# Patient Record
Sex: FEMALE | Race: BLACK OR AFRICAN AMERICAN
[De-identification: names, ages, dates, MRNs, and addresses within clinical notes are randomized per-mention and may not be internally consistent; named-entity substitution may affect disease eponyms.]

---

## 2017-03-30 NOTE — RAD
LEFT SHOULDER THREE VIEWS:

 

History: Fall three months ago, chronic pain. 

 

Comparison: None. 

 

FINDINGS: 

No acute fracture or malalignment. Mild degenerative disease of the acromioclavicular joint. There i
s calcific tendinosis of the rotator cuff at the level of the supraspinatus tendon. 

 

IMPRESSION: 

1. No acute fracture or malalignment. 

2. Mild calcific tendinosis of the rotator cuff. 

3. No acute fracture or malalignment. 

 

POS: [facility]

## 2018-09-27 NOTE — MRI
MRI OF THE LEFT SHOULDER:

 

Date: 9-27-18 

 

Provided Clinical History: 

Left shoulder pain. 

 

FINDINGS: 

Comparison is made with a study dated 4-22-17. 

 

Evaluation is limited by patient motion. 

 

The components of the rotator cuff demonstrate no evidence for full thickness tear. There is increase
d signal intensity on fluid sensitive sequences involving the under surface of the posterior distal s
upraspinatus tendon that could reflect low grade partial thickness undersurface tearing. Signal alter
ation involving the cranial fibers of the subscapularis near the lesser tuberosity insertion could al
so reflect partial thickness undersurface tearing. There is thickening and increased signal intensity
 on fluid sensitive sequences involving the intraarticular segment of the long head biceps tendon. Sl
ightly irregular appearance to the long head biceps tendon within the bicipital groove is also seen. 


 

There is a small glenohumeral joint effusion. The glenoid labrum and glenohumeral articular cartilage
 appear normal. There is no significant subacromial/subdeltoid bursal fluid. Acromioclavicular joint 
osteoarthrosis is noted with mild mass effect upon the subjacent supraspinatus. Rotator cuff muscular
 volume appears preserved. 

 

There is a small focus of oval diminished signal intensity involving the region of the infraspinatus 
tendon near the insertion likely reflecting sequellae of prior calcific tendinitis. No surrounding ed
ematous type change is seen. 

 

IMPRESSION: 

1. At least tendinosis and probably partial thickness interstitial tearing involving the intraarticul
ar and proximal bicipital segments of the long head biceps tendon. 

2. Suspected under surface tearing involving the subscapularis and supraspinatus tendons as described
 above. 

3. Small glenohumeral joint effusion. 

 

POS: [facility]

## 2018-10-19 ENCOUNTER — HOSPITAL ENCOUNTER (OUTPATIENT)
Dept: HOSPITAL 92 - LABBT | Age: 58
Discharge: HOME | End: 2018-10-19
Attending: ORTHOPAEDIC SURGERY
Payer: MEDICARE

## 2018-10-19 DIAGNOSIS — S46.212A: ICD-10-CM

## 2018-10-19 DIAGNOSIS — Z01.812: Primary | ICD-10-CM

## 2018-10-19 DIAGNOSIS — M75.102: ICD-10-CM

## 2018-10-19 LAB
ANION GAP SERPL CALC-SCNC: 12 MMOL/L (ref 10–20)
BASOPHILS # BLD AUTO: 0 THOU/UL (ref 0–0.2)
BASOPHILS NFR BLD AUTO: 0.7 % (ref 0–1)
BUN SERPL-MCNC: 15 MG/DL (ref 9.8–20.1)
CALCIUM SERPL-MCNC: 9.4 MG/DL (ref 7.8–10.44)
CHLORIDE SERPL-SCNC: 102 MMOL/L (ref 98–107)
CO2 SERPL-SCNC: 29 MMOL/L (ref 22–29)
CREAT CL PREDICTED SERPL C-G-VRATE: 0 ML/MIN (ref 70–130)
EOSINOPHIL # BLD AUTO: 0.2 THOU/UL (ref 0–0.7)
EOSINOPHIL NFR BLD AUTO: 2.1 % (ref 0–10)
GLUCOSE SERPL-MCNC: 107 MG/DL (ref 70–105)
HGB BLD-MCNC: 13.9 G/DL (ref 12–16)
LYMPHOCYTES # BLD: 2.5 THOU/UL (ref 1.2–3.4)
LYMPHOCYTES NFR BLD AUTO: 34.5 % (ref 21–51)
MCH RBC QN AUTO: 29.8 PG (ref 27–31)
MCV RBC AUTO: 90.9 FL (ref 78–98)
MONOCYTES # BLD AUTO: 0.5 THOU/UL (ref 0.11–0.59)
MONOCYTES NFR BLD AUTO: 6.2 % (ref 0–10)
NEUTROPHILS # BLD AUTO: 4.2 THOU/UL (ref 1.4–6.5)
NEUTROPHILS NFR BLD AUTO: 56.6 % (ref 42–75)
PLATELET # BLD AUTO: 288 THOU/UL (ref 130–400)
POTASSIUM SERPL-SCNC: 4 MMOL/L (ref 3.5–5.1)
RBC # BLD AUTO: 4.65 MILL/UL (ref 4.2–5.4)
SODIUM SERPL-SCNC: 139 MMOL/L (ref 136–145)
WBC # BLD AUTO: 7.4 THOU/UL (ref 4.8–10.8)

## 2018-10-19 PROCEDURE — 80048 BASIC METABOLIC PNL TOTAL CA: CPT

## 2018-10-19 PROCEDURE — 85025 COMPLETE CBC W/AUTO DIFF WBC: CPT

## 2018-10-26 ENCOUNTER — HOSPITAL ENCOUNTER (EMERGENCY)
Dept: HOSPITAL 92 - ERS | Age: 58
Discharge: HOME | End: 2018-10-26
Payer: MEDICARE

## 2018-10-26 ENCOUNTER — HOSPITAL ENCOUNTER (OUTPATIENT)
Dept: HOSPITAL 92 - SDC | Age: 58
Discharge: HOME | End: 2018-10-26
Attending: ORTHOPAEDIC SURGERY
Payer: MEDICARE

## 2018-10-26 VITALS — BODY MASS INDEX: 48.5 KG/M2

## 2018-10-26 DIAGNOSIS — E78.5: ICD-10-CM

## 2018-10-26 DIAGNOSIS — Z91.040: ICD-10-CM

## 2018-10-26 DIAGNOSIS — Z79.899: ICD-10-CM

## 2018-10-26 DIAGNOSIS — F41.9: ICD-10-CM

## 2018-10-26 DIAGNOSIS — Z79.82: ICD-10-CM

## 2018-10-26 DIAGNOSIS — F32.9: ICD-10-CM

## 2018-10-26 DIAGNOSIS — K21.9: ICD-10-CM

## 2018-10-26 DIAGNOSIS — Z88.8: ICD-10-CM

## 2018-10-26 DIAGNOSIS — Z79.1: ICD-10-CM

## 2018-10-26 DIAGNOSIS — E66.9: ICD-10-CM

## 2018-10-26 DIAGNOSIS — Z88.0: ICD-10-CM

## 2018-10-26 DIAGNOSIS — I10: ICD-10-CM

## 2018-10-26 DIAGNOSIS — M75.122: Primary | ICD-10-CM

## 2018-10-26 DIAGNOSIS — M75.22: ICD-10-CM

## 2018-10-26 DIAGNOSIS — Z88.2: ICD-10-CM

## 2018-10-26 DIAGNOSIS — S46.212A: ICD-10-CM

## 2018-10-26 DIAGNOSIS — E11.9: ICD-10-CM

## 2018-10-26 DIAGNOSIS — Z79.4: ICD-10-CM

## 2018-10-26 DIAGNOSIS — T85.898A: Primary | ICD-10-CM

## 2018-10-26 PROCEDURE — 99284 EMERGENCY DEPT VISIT MOD MDM: CPT

## 2018-10-26 PROCEDURE — 97139 UNLISTED THERAPEUTIC PX: CPT

## 2018-10-26 PROCEDURE — 29827 SHO ARTHRS SRG RT8TR CUF RPR: CPT

## 2018-10-26 PROCEDURE — 82962 GLUCOSE BLOOD TEST: CPT

## 2018-10-26 PROCEDURE — 96376 TX/PRO/DX INJ SAME DRUG ADON: CPT

## 2018-10-26 PROCEDURE — 0LM24ZZ REATTACHMENT OF LEFT SHOULDER TENDON, PERCUTANEOUS ENDOSCOPIC APPROACH: ICD-10-PCS | Performed by: ORTHOPAEDIC SURGERY

## 2018-10-26 PROCEDURE — 36416 COLLJ CAPILLARY BLOOD SPEC: CPT

## 2018-10-26 PROCEDURE — 96374 THER/PROPH/DIAG INJ IV PUSH: CPT

## 2018-10-26 PROCEDURE — 0RNK4ZZ RELEASE LEFT SHOULDER JOINT, PERCUTANEOUS ENDOSCOPIC APPROACH: ICD-10-PCS | Performed by: ORTHOPAEDIC SURGERY

## 2018-10-26 PROCEDURE — 94660 CPAP INITIATION&MGMT: CPT

## 2018-10-26 PROCEDURE — A4306 DRUG DELIVERY SYSTEM <=50 ML: HCPCS

## 2018-10-26 PROCEDURE — 0RHK44Z INSERTION OF INTERNAL FIXATION DEVICE INTO LEFT SHOULDER JOINT, PERCUTANEOUS ENDOSCOPIC APPROACH: ICD-10-PCS | Performed by: ORTHOPAEDIC SURGERY

## 2018-10-26 PROCEDURE — 29826 SHO ARTHRS SRG DECOMPRESSION: CPT

## 2019-01-15 ENCOUNTER — HOSPITAL ENCOUNTER (OUTPATIENT)
Dept: HOSPITAL 92 - BICMAMMO | Age: 59
Discharge: HOME | End: 2019-01-15
Payer: MEDICARE

## 2019-01-15 DIAGNOSIS — Z12.31: Primary | ICD-10-CM

## 2019-01-15 PROCEDURE — 77067 SCR MAMMO BI INCL CAD: CPT

## 2019-01-15 PROCEDURE — 77063 BREAST TOMOSYNTHESIS BI: CPT

## 2019-02-26 ENCOUNTER — HOSPITAL ENCOUNTER (EMERGENCY)
Dept: HOSPITAL 92 - ERS | Age: 59
Discharge: HOME | End: 2019-02-26
Payer: MEDICARE

## 2019-02-26 DIAGNOSIS — M19.90: ICD-10-CM

## 2019-02-26 DIAGNOSIS — F17.210: ICD-10-CM

## 2019-02-26 DIAGNOSIS — E11.9: ICD-10-CM

## 2019-02-26 DIAGNOSIS — K21.9: ICD-10-CM

## 2019-02-26 DIAGNOSIS — M10.9: ICD-10-CM

## 2019-02-26 DIAGNOSIS — L03.116: Primary | ICD-10-CM

## 2019-02-26 DIAGNOSIS — I10: ICD-10-CM

## 2019-02-26 DIAGNOSIS — F41.9: ICD-10-CM

## 2019-02-26 DIAGNOSIS — E78.5: ICD-10-CM

## 2019-02-26 DIAGNOSIS — F32.9: ICD-10-CM

## 2019-02-26 PROCEDURE — 85379 FIBRIN DEGRADATION QUANT: CPT

## 2019-02-26 PROCEDURE — 96372 THER/PROPH/DIAG INJ SC/IM: CPT

## 2019-02-26 NOTE — ULT
LEFT LOWER EXTREMITY VENOUS DUPLEX STUDY:

2/26/19

 

Deep veins of the left lower extremity evaluated with color doppler, spectral analysis and compressio
n.

 

INDICATIONS:

Left lower extremity pain and edema. 

 

Deep veins of the left lower extremity show normal blood flow and compression. No evidence of DVT. Pr
ominent lymph nodes are seen in the left inguinal region. 

 

IMPRESSION:  

1.      No evidence of left lower extremity DVT. 

2.      Prominent left inguinal lymph nodes noted. 

 

POS: [facility]

## 2019-02-26 NOTE — RAD
LEFT ANKLE 3 VIEWS:

 

Date:  02/26/19 

 

HISTORY:  

Ankle injury. 

 

FINDINGS:

There are vascular calcifications noted. The lateral view is very obliqued and therefore difficult to
 assess. There is spurring along the medial malleolus. There is ossification which is near the talona
vicular joint. It may be related to an os naviculare, difficult to assess on these views. Calcaneal s
purs are present. I do not see any signs of fracture.

 

IMPRESSION: 

No evidence of fracture. Assessment of the foot is limited due to the orientation of the images. 

 

 

POS: [facility]

## 2019-05-13 ENCOUNTER — HOSPITAL ENCOUNTER (OUTPATIENT)
Dept: HOSPITAL 92 - WCC | Age: 59
Discharge: HOME | End: 2019-05-13
Attending: FAMILY MEDICINE
Payer: MEDICARE

## 2019-05-13 DIAGNOSIS — E11.22: ICD-10-CM

## 2019-05-13 DIAGNOSIS — E11.65: ICD-10-CM

## 2019-05-13 DIAGNOSIS — I50.30: ICD-10-CM

## 2019-05-13 DIAGNOSIS — G89.29: ICD-10-CM

## 2019-05-13 DIAGNOSIS — I87.312: Primary | ICD-10-CM

## 2019-05-13 DIAGNOSIS — I11.0: ICD-10-CM

## 2019-05-13 DIAGNOSIS — M54.9: ICD-10-CM

## 2019-05-13 DIAGNOSIS — M48.00: ICD-10-CM

## 2019-05-13 DIAGNOSIS — K21.9: ICD-10-CM

## 2019-05-13 DIAGNOSIS — L97.922: ICD-10-CM

## 2019-05-13 PROCEDURE — A4218 STERILE SALINE OR WATER: HCPCS

## 2019-05-14 NOTE — HP
HISTORY OF PRESENT ILLNESS:  Ms. Nat Ibrahim is a very pleasant 58-year-old, who

presents to [facility] for evaluation of an ulceration of the left lower leg.

The patient states that she scraped her leg on a curb in February of this year.  She

states that initially she utilized mupirocin for treatment of the wound, but because

of burning, began utilizing Vaseline.  She states that plain films of the left ankle

were obtained by Dr. Nunez, and the patient was eventually seen by Dr. Morton

because the wound remained unhealed.  The patient was referred to the [facility]

by Dr. Morton on 05/02/2019.  The patient states that she has been cleansing the

wound with sterile water and gauze.  She has been dressing the wound with Hydrofera

Blue followed by a Band-Aid.  The patient has also been taking Keflex as per Dr. Morton. 



PAST MEDICAL HISTORY:  

1. Gastroesophageal reflux disease.

2. Diabetes mellitus.

3. Hypertension.

4. Chronic back pain.

5. Spinal stenosis.

6. Congestive heart failure, diastolic.



PAST SURGICAL HISTORY:  

1. Exploratory laparotomy for ruptured right ovarian cyst.

2. Incision and drainage of infected abdominal wound.

3. Appendectomy.

4. Cholecystectomy.

5. Herniorrhaphy.

6. Tonsillectomy.

7. Arthroscopic surgery of left shoulder.

8. Back surgery x2.



MEDICATIONS:  

1. Keflex.

2. Levemir.

3. Glipizide.

4. Furosemide.

5. Pravachol.

6. Lisinopril.

7. Melatonin.

8. Potassium.

9. Cyclobenzaprine.

10. Effexor.

11. Vitamin D.

12. Amlodipine.

13. Zetia.

14. Meloxicam.

15. Inhaler.



ALLERGIES:  LATEX, SULFA, ERYTHROMYCIN.



SOCIAL HISTORY:  Significant for tobacco use of up to 1 pack of cigarettes per day

for 40 years.  The patient denies any history of EtOH use. 



FAMILY HISTORY:  The patient states she is adopted.



PHYSICAL EXAMINATION:

VITAL SIGNS:  Temperature 97.5, pulse 92, blood pressure 188/78.  Accu-Chek 81. 

GENERAL:  A 58-year-old female, sitting on stretcher in examination room, in no

acute distress. 

HEENT:  Normocephalic and atraumatic. 

NECK:  No nuchal rigidity. 

CHEST:  Clear to auscultation. 

CV:  Regular rate and rhythm. 

ABDOMEN:  Soft. 

EXTREMITIES:  An ulceration of the left lower leg in the region of the lateral

malleolus is present, which measures approximately 0.8 x 0.4 cm.  Granulation tissue

is present within the wound margins.  Necrotic and nonviable tissue present within

the wound margins was debrided with an excisional full-thickness debridement with

the use of a curette.  No purulent drainage is associated with the wound.  No

erythema of the skin surrounding the wound is present.  No maceration of the skin of

the periwound is noted.  A dorsalis pedis pulse is palpable on the left.  No

significant edema of the left foot or lower leg is present on exam today. 

NEURO:  Grossly nonfocal.



ASSESSMENT AND PLAN:  

1. Chronic venous hypertension with ulceration.  Dressing changes of Hydrofera Blue

will be initiated today.  These dressing changes are to be performed on a daily

basis after cleansing and irrigation.  The patient states that she will be

performing her own dressing changes.  I will see Ms. Ibrahmi again in 2 weeks if her

ulceration is still present.  At this time, the patient has been instructed to

continue Keflex as previously prescribed.  The patient understands and is in

agreement with the preceding treatment plan.  The patient has previously undergone

venous ablation on the left by Dr. Jhaveri.  She also has compression garments.

The patient states that she will begin utilizing her compression garments

immediately should she begin to experience edema of her right or left lower

extremity. 

2. Gastroesophageal reflux disease.

3. Diabetes mellitus.  The patient's Accu-Chek in clinic today is 81.  The patient

has been told that for optimal wound healing, her blood glucoses should remain below

150. 

4. Hypertension.

5. Chronic back pain.

6. Spinal stenosis.

7. Congestive heart failure, diastolic.







Job ID:  [id number]

## 2020-08-31 NOTE — RAD
EXAM:

XR Chest 1 View Portable



PROVIDED CLINICAL HISTORY:

Dyspnea



COMPARISON:

12/12/2016



FINDINGS:

Cardiac silhouette remains enlarged. Prominence of the pulmonary vasculature and pulmonary interstiti
um persists. Evaluation is limited by patient body habitus. No lobar consolidation, large effusion

or pneumothorax evident.



IMPRESSION:

Cardiomegaly and findings suggesting congestive failure. Alveolar edema is not excluded. Follow-up is
 recommended.



Reported By: Jared Turner 

Electronically Signed:  8/31/2020 12:18 PM

## 2020-09-25 ENCOUNTER — HOSPITAL ENCOUNTER (OUTPATIENT)
Dept: HOSPITAL 92 - BICMAMMO | Age: 60
Discharge: HOME | End: 2020-09-25
Payer: MEDICARE

## 2020-09-25 DIAGNOSIS — Z12.31: Primary | ICD-10-CM

## 2020-09-25 PROCEDURE — 77067 SCR MAMMO BI INCL CAD: CPT

## 2020-09-25 PROCEDURE — 77063 BREAST TOMOSYNTHESIS BI: CPT

## 2020-09-25 NOTE — MMO
Bilateral MAMMO Bilat Screen [facility].

 

CLINICAL HISTORY:

Patient is 60 years old and is seen for screening. The patient has no family

history of breast cancer.  The patient has no personal history of cancer.

 

VIEWS:

The views performed were:  bilateral craniocaudal; bilateral craniocaudal with

tomosynthesis; bilateral mediolateral oblique; and bilateral mediolateral

oblique with tomosynthesis.

 

FILMS COMPARED:

The present examination has been compared to prior imaging studies performed at

[facility] on 09/22/2016 and 01/15/2019.

 

This study has been interpreted with the assistance of computer-aided detection.

 

MAMMOGRAM FINDINGS:

The breasts are heterogeneously dense, which could obscure a lesion on

mammography.

 

Benign calcifications are noted bilaterally.

 

There are no suspicious masses, suspicious calcifications, or new areas of

architectural distortion.

 

IMPRESSION:

THERE IS NO MAMMOGRAPHIC EVIDENCE OF MALIGNANCY.

 

A ROUTINE FOLLOW-UP MAMMOGRAM IN 1 YEAR IS RECOMMENDED.

 

THE RESULTS OF THIS EXAM WERE SENT TO THE PATIENT.

 

ACR BI-RADS Category 2 - Benign finding

 

MAMMOGRAPHY NOTE:

 1. A negative mammogram report should not delay a biopsy if a dominant of

 clinically suspicious mass is present.

 2. Approximately 10% to 15% of breast cancers are not detected by

 mammography.

 3. Adenosis and dense breasts may obscure an underlying neoplasm.

 

 

Reported by: LUKASZ GOODWIN MD

Electonically Signed: [id number]

## 2021-02-15 ENCOUNTER — HOSPITAL ENCOUNTER (EMERGENCY)
Dept: HOSPITAL 9 - MADERS | Age: 61
Discharge: HOME | End: 2021-02-15
Payer: MEDICARE

## 2021-02-15 DIAGNOSIS — E11.65: ICD-10-CM

## 2021-02-15 DIAGNOSIS — M10.9: ICD-10-CM

## 2021-02-15 DIAGNOSIS — I11.0: Primary | ICD-10-CM

## 2021-02-15 DIAGNOSIS — E87.6: ICD-10-CM

## 2021-02-15 DIAGNOSIS — J44.9: ICD-10-CM

## 2021-02-15 DIAGNOSIS — I50.9: ICD-10-CM

## 2021-02-15 DIAGNOSIS — Z79.899: ICD-10-CM

## 2021-02-15 PROCEDURE — 85025 COMPLETE CBC W/AUTO DIFF WBC: CPT

## 2021-02-15 PROCEDURE — 84295 ASSAY OF SERUM SODIUM: CPT

## 2021-02-15 PROCEDURE — 83880 ASSAY OF NATRIURETIC PEPTIDE: CPT

## 2021-02-15 PROCEDURE — 82803 BLOOD GASES ANY COMBINATION: CPT

## 2021-02-15 PROCEDURE — 84484 ASSAY OF TROPONIN QUANT: CPT

## 2021-02-15 PROCEDURE — 82330 ASSAY OF CALCIUM: CPT

## 2021-02-15 PROCEDURE — 82435 ASSAY OF BLOOD CHLORIDE: CPT

## 2021-02-15 PROCEDURE — 85014 HEMATOCRIT: CPT

## 2021-02-15 PROCEDURE — 84132 ASSAY OF SERUM POTASSIUM: CPT

## 2021-02-15 PROCEDURE — 80053 COMPREHEN METABOLIC PANEL: CPT

## 2021-02-15 PROCEDURE — 71046 X-RAY EXAM CHEST 2 VIEWS: CPT

## 2021-02-15 PROCEDURE — 36415 COLL VENOUS BLD VENIPUNCTURE: CPT

## 2021-02-15 NOTE — RAD
CHEST TWO VIEWS:

 

02/15/21

 

10:39 a.m.

 

HISTORY:

Chest pain. Shortness of breath.

 

COMPARISON:

08/31/2020

 

FINDINGS:

There is enlargement of the cardiac silhouette. Dorsal column stimulators overly the lower thoracic r
egion. There is no pneumothorax. There is nonspecific interstitial opacity in the perihilar regions i
n both lung bases with small bilateral pleural effusions.

 

IMPRESSION:

Pulmonary vascular congestion with perihilar and bibasilar interstitial prominence and small bilatera
l pleural effusions.

 

Findings suggest interstitial edema. Recommend short-term follow-up imaging following treatment to do
cument resolution.

 

Nonspecific infectious pneumonitis cannot be excluded.

 

POS: [facility]

## 2021-02-16 LAB
ALBUMIN SERPL BCG-MCNC: 3.7 G/DL (ref 3.4–4.8)
ALP SERPL-CCNC: 81 U/L (ref 40–110)
ALT SERPL W P-5'-P-CCNC: 14 U/L (ref 8–55)
ANION GAP SERPL CALC-SCNC: 14 MMOL/L (ref 10–20)
AST SERPL-CCNC: 9 U/L (ref 5–34)
BASOPHILS # BLD AUTO: 0.2 THOU/UL (ref 0–0.2)
BASOPHILS NFR BLD AUTO: 1.7 % (ref 0–1)
BILIRUB SERPL-MCNC: 0.6 MG/DL (ref 0.2–1.2)
BUN SERPL-MCNC: 10 MG/DL (ref 9.8–20.1)
CA-I BLDV-SCNC: 1.06 MMOL/L (ref 1.15–1.33)
CALCIUM SERPL-MCNC: 8.5 MG/DL (ref 7.8–10.44)
CHLORIDE BLDV-SCNC: 101 MMOL/L (ref 98–107)
CHLORIDE SERPL-SCNC: 100 MMOL/L (ref 98–107)
CO2 BLDV CALC-SCNC: 31.5 MMOL/L (ref 22–28)
CO2 SERPL-SCNC: 29 MMOL/L (ref 23–31)
CREAT CL PREDICTED SERPL C-G-VRATE: 0 ML/MIN (ref 70–130)
EOSINOPHIL # BLD AUTO: 0.2 THOU/UL (ref 0–0.7)
EOSINOPHIL NFR BLD AUTO: 2.3 % (ref 0–10)
GLOBULIN SER CALC-MCNC: 3.3 G/DL (ref 2.4–3.5)
GLUCOSE SERPL-MCNC: 314 MG/DL (ref 80–115)
HCO3 BLDV-SCNC: 29.7 MMOL/L (ref 22–28)
HCT VFR BLD CALC: 41 % (ref 36–47)
HGB BLD CALC-MCNC: 14.1 G/DL (ref 12–16)
HGB BLD-MCNC: 13.3 G/DL (ref 12–16)
LYMPHOCYTES # BLD AUTO: 1.2 THOU/UL (ref 1.2–3.4)
LYMPHOCYTES NFR BLD AUTO: 12.2 % (ref 21–51)
MCH RBC QN AUTO: 28.7 PG (ref 27–31)
MCV RBC AUTO: 88.7 FL (ref 78–98)
MONOCYTES # BLD AUTO: 0.9 THOU/UL (ref 0.11–0.59)
MONOCYTES NFR BLD AUTO: 8.8 % (ref 0–10)
NEUTROPHILS # BLD AUTO: 7.4 THOU/UL (ref 1.4–6.5)
NEUTROPHILS NFR BLD AUTO: 75 % (ref 42–75)
PCO2 BLDV: 57.8 MMHG (ref 40–50)
PLATELET # BLD AUTO: 270 THOU/UL (ref 130–400)
POTASSIUM BLDV-SCNC: 3.2 MMOL/L (ref 3.5–5.1)
POTASSIUM SERPL-SCNC: 3.2 MMOL/L (ref 3.5–5.1)
RBC # BLD AUTO: 4.65 MILL/UL (ref 4.2–5.4)
SAO2 % BLDV FROM PO2: 94.2 % (ref 60–85)
SODIUM BLDV-SCNC: 141 MMOL/L (ref 138–145)
SODIUM SERPL-SCNC: 140 MMOL/L (ref 136–145)
TROPONIN I SERPL DL<=0.01 NG/ML-MCNC: 0.01 NG/ML (ref ?–0.03)
WBC # BLD AUTO: 9.9 THOU/UL (ref 4.8–10.8)

## 2021-05-27 ENCOUNTER — HOSPITAL ENCOUNTER (EMERGENCY)
Dept: HOSPITAL 9 - MADERS | Age: 61
Discharge: HOME | End: 2021-05-27
Payer: MEDICARE

## 2021-05-27 DIAGNOSIS — F17.210: ICD-10-CM

## 2021-05-27 DIAGNOSIS — Z79.899: ICD-10-CM

## 2021-05-27 DIAGNOSIS — E78.5: ICD-10-CM

## 2021-05-27 DIAGNOSIS — M71.22: Primary | ICD-10-CM

## 2021-05-27 DIAGNOSIS — E11.9: ICD-10-CM

## 2021-05-27 DIAGNOSIS — I10: ICD-10-CM

## 2021-05-27 DIAGNOSIS — J44.9: ICD-10-CM

## 2021-05-27 PROCEDURE — 96374 THER/PROPH/DIAG INJ IV PUSH: CPT

## 2021-06-24 ENCOUNTER — HOSPITAL ENCOUNTER (INPATIENT)
Dept: HOSPITAL 9 - MADMS | Age: 61
LOS: 8 days | Discharge: HOME HEALTH | DRG: 948 | End: 2021-07-02
Attending: FAMILY MEDICINE | Admitting: FAMILY MEDICINE
Payer: MEDICARE

## 2021-06-24 VITALS — BODY MASS INDEX: 46.7 KG/M2

## 2021-06-24 DIAGNOSIS — F41.9: ICD-10-CM

## 2021-06-24 DIAGNOSIS — E11.649: ICD-10-CM
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
## 2021-06-24 DIAGNOSIS — Z91.81: ICD-10-CM

## 2021-06-24 DIAGNOSIS — F17.210: ICD-10-CM

## 2021-06-24 DIAGNOSIS — Z96.82: ICD-10-CM

## 2021-06-24 DIAGNOSIS — E66.01: ICD-10-CM

## 2021-06-24 DIAGNOSIS — G89.29: ICD-10-CM

## 2021-06-24 DIAGNOSIS — Z79.899: ICD-10-CM
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
## 2021-06-24 DIAGNOSIS — G47.33: ICD-10-CM

## 2021-06-24 DIAGNOSIS — Z88.0: ICD-10-CM

## 2021-06-24 DIAGNOSIS — N32.81: ICD-10-CM

## 2021-06-24 DIAGNOSIS — E11.610: ICD-10-CM

## 2021-06-24 DIAGNOSIS — Z90.49: ICD-10-CM

## 2021-06-24 DIAGNOSIS — I10: ICD-10-CM

## 2021-06-24 DIAGNOSIS — R53.81: Primary | ICD-10-CM

## 2021-06-24 DIAGNOSIS — J45.909: ICD-10-CM

## 2021-06-24 DIAGNOSIS — M10.9: ICD-10-CM

## 2021-06-24 DIAGNOSIS — E78.2: ICD-10-CM

## 2021-06-24 DIAGNOSIS — Z88.1: ICD-10-CM

## 2021-06-24 DIAGNOSIS — Z88.8: ICD-10-CM

## 2021-06-24 DIAGNOSIS — Z79.4: ICD-10-CM

## 2021-06-24 DIAGNOSIS — Z88.2: ICD-10-CM

## 2021-06-24 DIAGNOSIS — Z90.710: ICD-10-CM

## 2021-06-24 DIAGNOSIS — F32.9: ICD-10-CM

## 2021-06-24 DIAGNOSIS — Z91.040: ICD-10-CM

## 2021-06-24 PROCEDURE — 80048 BASIC METABOLIC PNL TOTAL CA: CPT

## 2021-06-24 PROCEDURE — 36415 COLL VENOUS BLD VENIPUNCTURE: CPT

## 2021-06-24 PROCEDURE — 36416 COLLJ CAPILLARY BLOOD SPEC: CPT

## 2021-06-24 RX ADMIN — INSULIN GLARGINE SCH UNIT: 100 INJECTION, SOLUTION SUBCUTANEOUS at 21:24

## 2021-06-25 RX ADMIN — INSULIN GLARGINE SCH UNIT: 100 INJECTION, SOLUTION SUBCUTANEOUS at 21:06

## 2021-06-25 RX ADMIN — INSULIN GLARGINE SCH UNIT: 100 INJECTION, SOLUTION SUBCUTANEOUS at 08:19

## 2021-06-26 RX ADMIN — INSULIN GLARGINE SCH UNIT: 100 INJECTION, SOLUTION SUBCUTANEOUS at 20:08

## 2021-06-26 RX ADMIN — INSULIN GLARGINE SCH UNIT: 100 INJECTION, SOLUTION SUBCUTANEOUS at 08:51

## 2021-06-26 RX ADMIN — INSULIN LISPRO PRN UNIT: 100 INJECTION, SOLUTION INTRAVENOUS; SUBCUTANEOUS at 21:02

## 2021-06-26 RX ADMIN — AZELASTINE HYDROCHLORIDE SCH: 137 SPRAY, METERED NASAL at 14:19

## 2021-06-27 LAB
ANION GAP SERPL CALC-SCNC: 11 MMOL/L (ref 10–20)
BUN SERPL-MCNC: 25 MG/DL (ref 9.8–20.1)
CALCIUM SERPL-MCNC: 8.3 MG/DL (ref 7.8–10.44)
CHLORIDE SERPL-SCNC: 110 MMOL/L (ref 98–107)
CO2 SERPL-SCNC: 27 MMOL/L (ref 23–31)
CREAT CL PREDICTED SERPL C-G-VRATE: 155 ML/MIN (ref 70–130)
GLUCOSE SERPL-MCNC: 108 MG/DL (ref 80–115)
POTASSIUM SERPL-SCNC: 3.9 MMOL/L (ref 3.5–5.1)
SODIUM SERPL-SCNC: 144 MMOL/L (ref 136–145)

## 2021-06-27 RX ADMIN — INSULIN GLARGINE SCH UNIT: 100 INJECTION, SOLUTION SUBCUTANEOUS at 21:01

## 2021-06-27 RX ADMIN — INSULIN GLARGINE SCH UNIT: 100 INJECTION, SOLUTION SUBCUTANEOUS at 09:37

## 2021-06-28 RX ADMIN — INSULIN GLARGINE SCH UNIT: 100 INJECTION, SOLUTION SUBCUTANEOUS at 21:05

## 2021-06-28 RX ADMIN — INSULIN LISPRO PRN UNIT: 100 INJECTION, SOLUTION INTRAVENOUS; SUBCUTANEOUS at 17:33

## 2021-06-28 RX ADMIN — INSULIN LISPRO PRN UNIT: 100 INJECTION, SOLUTION INTRAVENOUS; SUBCUTANEOUS at 21:05

## 2021-06-29 RX ADMIN — INSULIN GLARGINE SCH UNIT: 100 INJECTION, SOLUTION SUBCUTANEOUS at 08:25

## 2021-06-29 RX ADMIN — INSULIN GLARGINE SCH UNIT: 100 INJECTION, SOLUTION SUBCUTANEOUS at 21:09

## 2021-06-30 RX ADMIN — INSULIN LISPRO PRN UNIT: 100 INJECTION, SOLUTION INTRAVENOUS; SUBCUTANEOUS at 11:51

## 2021-06-30 RX ADMIN — INSULIN GLARGINE SCH UNIT: 100 INJECTION, SOLUTION SUBCUTANEOUS at 08:34

## 2021-06-30 RX ADMIN — INSULIN GLARGINE SCH UNIT: 100 INJECTION, SOLUTION SUBCUTANEOUS at 22:06

## 2021-06-30 RX ADMIN — INSULIN LISPRO PRN UNIT: 100 INJECTION, SOLUTION INTRAVENOUS; SUBCUTANEOUS at 17:04

## 2021-07-01 RX ADMIN — INSULIN GLARGINE SCH UNIT: 100 INJECTION, SOLUTION SUBCUTANEOUS at 21:04

## 2021-07-01 RX ADMIN — INSULIN GLARGINE SCH: 100 INJECTION, SOLUTION SUBCUTANEOUS at 01:34

## 2021-07-01 RX ADMIN — INSULIN GLARGINE SCH UNIT: 100 INJECTION, SOLUTION SUBCUTANEOUS at 08:09

## 2021-07-02 VITALS — TEMPERATURE: 97.6 F | SYSTOLIC BLOOD PRESSURE: 142 MMHG | DIASTOLIC BLOOD PRESSURE: 66 MMHG

## 2021-07-02 RX ADMIN — INSULIN GLARGINE SCH UNIT: 100 INJECTION, SOLUTION SUBCUTANEOUS at 08:43

## 2021-10-11 ENCOUNTER — HOSPITAL ENCOUNTER (EMERGENCY)
Dept: HOSPITAL 9 - MADERS | Age: 61
Discharge: HOME | End: 2021-10-11
Payer: MEDICARE

## 2021-10-11 DIAGNOSIS — Z79.899: ICD-10-CM

## 2021-10-11 DIAGNOSIS — J44.9: ICD-10-CM

## 2021-10-11 DIAGNOSIS — N39.0: ICD-10-CM

## 2021-10-11 DIAGNOSIS — F17.210: ICD-10-CM

## 2021-10-11 DIAGNOSIS — E11.649: Primary | ICD-10-CM

## 2021-10-11 DIAGNOSIS — E78.5: ICD-10-CM

## 2021-10-11 LAB
ALBUMIN SERPL BCG-MCNC: 3.6 G/DL (ref 3.4–4.8)
ALP SERPL-CCNC: 87 U/L (ref 40–110)
ALT SERPL W P-5'-P-CCNC: 12 U/L (ref 8–55)
ANION GAP SERPL CALC-SCNC: 12 MMOL/L (ref 10–20)
AST SERPL-CCNC: 8 U/L (ref 5–34)
BACTERIA UR QL AUTO: (no result) HPF
BASOPHILS # BLD AUTO: 0.3 THOU/UL (ref 0–0.2)
BASOPHILS NFR BLD AUTO: 2.9 % (ref 0–1)
BILIRUB SERPL-MCNC: 0.5 MG/DL (ref 0.2–1.2)
BUN SERPL-MCNC: 16 MG/DL (ref 9.8–20.1)
CALCIUM SERPL-MCNC: 9.3 MG/DL (ref 7.8–10.44)
CHLORIDE SERPL-SCNC: 108 MMOL/L (ref 98–107)
CK SERPL-CCNC: 48 U/L (ref 29–168)
CO2 SERPL-SCNC: 26 MMOL/L (ref 23–31)
CREAT CL PREDICTED SERPL C-G-VRATE: 0 ML/MIN (ref 70–130)
CRP SERPL-MCNC: (no result) MG/DL
EOSINOPHIL # BLD AUTO: 0.2 THOU/UL (ref 0–0.7)
EOSINOPHIL NFR BLD AUTO: 2.7 % (ref 0–10)
GLOBULIN SER CALC-MCNC: 3.3 G/DL (ref 2.4–3.5)
GLUCOSE SERPL-MCNC: 103 MG/DL (ref 80–115)
HGB BLD-MCNC: 14.3 G/DL (ref 12–16)
LYMPHOCYTES # BLD AUTO: 1.3 THOU/UL (ref 1.2–3.4)
LYMPHOCYTES NFR BLD AUTO: 14.3 % (ref 21–51)
MCH RBC QN AUTO: 28.9 PG (ref 27–31)
MCV RBC AUTO: 92 FL (ref 78–98)
MONOCYTES # BLD AUTO: 0.8 THOU/UL (ref 0.11–0.59)
MONOCYTES NFR BLD AUTO: 8.2 % (ref 0–10)
NEUTROPHILS # BLD AUTO: 6.5 THOU/UL (ref 1.4–6.5)
NEUTROPHILS NFR BLD AUTO: 71.9 % (ref 42–75)
PLATELET # BLD AUTO: 338 THOU/UL (ref 130–400)
POTASSIUM SERPL-SCNC: 3.9 MMOL/L (ref 3.5–5.1)
PROT UR STRIP.AUTO-MCNC: (no result) MG/DL
RBC # BLD AUTO: 4.96 MILL/UL (ref 4.2–5.4)
RBC UR QL AUTO: (no result) HPF (ref 0–3)
SODIUM SERPL-SCNC: 142 MMOL/L (ref 136–145)
SP GR UR STRIP: 1.02 (ref 1–1.03)
WBC # BLD AUTO: 9.1 THOU/UL (ref 4.8–10.8)
WBC UR QL AUTO: (no result) HPF (ref 0–3)

## 2021-10-11 PROCEDURE — 96375 TX/PRO/DX INJ NEW DRUG ADDON: CPT

## 2021-10-11 PROCEDURE — 87086 URINE CULTURE/COLONY COUNT: CPT

## 2021-10-11 PROCEDURE — C9113 INJ PANTOPRAZOLE SODIUM, VIA: HCPCS

## 2021-10-11 PROCEDURE — 81003 URINALYSIS AUTO W/O SCOPE: CPT

## 2021-10-11 PROCEDURE — 84443 ASSAY THYROID STIM HORMONE: CPT

## 2021-10-11 PROCEDURE — 81015 MICROSCOPIC EXAM OF URINE: CPT

## 2021-10-11 PROCEDURE — 87077 CULTURE AEROBIC IDENTIFY: CPT

## 2021-10-11 PROCEDURE — 86140 C-REACTIVE PROTEIN: CPT

## 2021-10-11 PROCEDURE — 83605 ASSAY OF LACTIC ACID: CPT

## 2021-10-11 PROCEDURE — 87186 SC STD MICRODIL/AGAR DIL: CPT

## 2021-10-11 PROCEDURE — 85025 COMPLETE CBC W/AUTO DIFF WBC: CPT

## 2021-10-11 PROCEDURE — 80053 COMPREHEN METABOLIC PANEL: CPT

## 2021-10-11 PROCEDURE — 96365 THER/PROPH/DIAG IV INF INIT: CPT

## 2021-10-11 PROCEDURE — 36416 COLLJ CAPILLARY BLOOD SPEC: CPT

## 2021-10-11 PROCEDURE — 82550 ASSAY OF CK (CPK): CPT

## 2021-10-11 PROCEDURE — 71045 X-RAY EXAM CHEST 1 VIEW: CPT

## 2021-10-11 PROCEDURE — 94760 N-INVAS EAR/PLS OXIMETRY 1: CPT

## 2021-10-11 PROCEDURE — 93005 ELECTROCARDIOGRAM TRACING: CPT

## 2021-10-11 PROCEDURE — 84484 ASSAY OF TROPONIN QUANT: CPT

## 2021-12-02 ENCOUNTER — HOSPITAL ENCOUNTER (EMERGENCY)
Dept: HOSPITAL 9 - MADERS | Age: 61
Discharge: HOME | End: 2021-12-02
Payer: MEDICARE

## 2021-12-02 DIAGNOSIS — J06.9: ICD-10-CM

## 2021-12-02 DIAGNOSIS — J44.9: ICD-10-CM

## 2021-12-02 DIAGNOSIS — E78.5: ICD-10-CM

## 2021-12-02 DIAGNOSIS — F17.210: ICD-10-CM

## 2021-12-02 DIAGNOSIS — E11.9: ICD-10-CM

## 2021-12-02 DIAGNOSIS — J18.9: Primary | ICD-10-CM

## 2021-12-02 DIAGNOSIS — Z20.822: ICD-10-CM

## 2021-12-02 DIAGNOSIS — I10: ICD-10-CM

## 2021-12-02 DIAGNOSIS — K52.9: ICD-10-CM

## 2021-12-02 LAB
ALBUMIN SERPL BCG-MCNC: 3.9 G/DL (ref 3.4–4.8)
ALP SERPL-CCNC: 101 U/L (ref 40–110)
ALT SERPL W P-5'-P-CCNC: 21 U/L (ref 8–55)
ANION GAP SERPL CALC-SCNC: 13 MMOL/L (ref 10–20)
AST SERPL-CCNC: 13 U/L (ref 5–34)
BASOPHILS # BLD AUTO: 0.1 THOU/UL (ref 0–0.2)
BASOPHILS NFR BLD AUTO: 0.8 % (ref 0–1)
BILIRUB SERPL-MCNC: 0.6 MG/DL (ref 0.2–1.2)
BUN SERPL-MCNC: 18 MG/DL (ref 9.8–20.1)
CALCIUM SERPL-MCNC: 9 MG/DL (ref 7.8–10.44)
CHLORIDE SERPL-SCNC: 107 MMOL/L (ref 98–107)
CO2 SERPL-SCNC: 26 MMOL/L (ref 23–31)
CREAT CL PREDICTED SERPL C-G-VRATE: 0 ML/MIN (ref 70–130)
EOSINOPHIL # BLD AUTO: 0.1 THOU/UL (ref 0–0.7)
EOSINOPHIL NFR BLD AUTO: 0.7 % (ref 0–10)
GLOBULIN SER CALC-MCNC: 3.9 G/DL (ref 2.4–3.5)
GLUCOSE SERPL-MCNC: 151 MG/DL (ref 80–115)
GLUCOSE UR STRIP-MCNC: >=1000 MG/DL
HGB BLD-MCNC: 14.3 G/DL (ref 12–16)
LIPASE SERPL-CCNC: 22 U/L (ref 8–78)
LYMPHOCYTES # BLD AUTO: 0.4 THOU/UL (ref 1.2–3.4)
LYMPHOCYTES NFR BLD AUTO: 3.9 % (ref 21–51)
MAGNESIUM SERPL-MCNC: 2 MG/DL (ref 1.6–2.6)
MCH RBC QN AUTO: 29.1 PG (ref 27–31)
MCV RBC AUTO: 92.3 FL (ref 78–98)
MONOCYTES # BLD AUTO: 0.4 THOU/UL (ref 0.11–0.59)
MONOCYTES NFR BLD AUTO: 4.1 % (ref 0–10)
NEUTROPHILS # BLD AUTO: 8.4 THOU/UL (ref 1.4–6.5)
NEUTROPHILS NFR BLD AUTO: 90.5 % (ref 42–75)
PLATELET # BLD AUTO: 277 THOU/UL (ref 130–400)
POTASSIUM SERPL-SCNC: 3.4 MMOL/L (ref 3.5–5.1)
PROT UR STRIP.AUTO-MCNC: (no result) MG/DL
RBC # BLD AUTO: 4.92 MILL/UL (ref 4.2–5.4)
RBC UR QL AUTO: (no result) HPF (ref 0–3)
SODIUM SERPL-SCNC: 143 MMOL/L (ref 136–145)
SP GR UR STRIP: 1.01 (ref 1–1.03)
WBC # BLD AUTO: 9.3 THOU/UL (ref 4.8–10.8)
WBC UR QL AUTO: (no result) HPF (ref 0–3)

## 2021-12-02 PROCEDURE — 71045 X-RAY EXAM CHEST 1 VIEW: CPT

## 2021-12-02 PROCEDURE — 83880 ASSAY OF NATRIURETIC PEPTIDE: CPT

## 2021-12-02 PROCEDURE — 81015 MICROSCOPIC EXAM OF URINE: CPT

## 2021-12-02 PROCEDURE — 0240U: CPT

## 2021-12-02 PROCEDURE — 96374 THER/PROPH/DIAG INJ IV PUSH: CPT

## 2021-12-02 PROCEDURE — 74177 CT ABD & PELVIS W/CONTRAST: CPT

## 2021-12-02 PROCEDURE — 80053 COMPREHEN METABOLIC PANEL: CPT

## 2021-12-02 PROCEDURE — 81003 URINALYSIS AUTO W/O SCOPE: CPT

## 2021-12-02 PROCEDURE — 83690 ASSAY OF LIPASE: CPT

## 2021-12-02 PROCEDURE — 83735 ASSAY OF MAGNESIUM: CPT

## 2021-12-02 PROCEDURE — 83605 ASSAY OF LACTIC ACID: CPT

## 2021-12-02 PROCEDURE — 85025 COMPLETE CBC W/AUTO DIFF WBC: CPT

## 2022-06-10 ENCOUNTER — HOSPITAL ENCOUNTER (EMERGENCY)
Dept: HOSPITAL 9 - MADERS | Age: 62
Discharge: HOME | End: 2022-06-10
Payer: MEDICARE

## 2022-06-10 DIAGNOSIS — E78.5: ICD-10-CM

## 2022-06-10 DIAGNOSIS — F17.210: ICD-10-CM

## 2022-06-10 DIAGNOSIS — E11.649: Primary | ICD-10-CM

## 2022-06-10 DIAGNOSIS — J44.9: ICD-10-CM

## 2022-06-10 DIAGNOSIS — I10: ICD-10-CM

## 2022-06-10 LAB
ALBUMIN SERPL BCG-MCNC: 3.9 G/DL (ref 3.4–4.8)
ALP SERPL-CCNC: 90 U/L (ref 40–110)
ALT SERPL W P-5'-P-CCNC: 15 U/L (ref 8–55)
ANION GAP SERPL CALC-SCNC: 19 MMOL/L (ref 10–20)
AST SERPL-CCNC: 13 U/L (ref 5–34)
BILIRUB SERPL-MCNC: 0.3 MG/DL (ref 0.2–1.2)
BUN SERPL-MCNC: 23 MG/DL (ref 9.8–20.1)
CA-I BLDV-SCNC: 1.07 MMOL/L (ref 1.15–1.33)
CALCIUM SERPL-MCNC: 8.9 MG/DL (ref 7.8–10.44)
CHLORIDE BLDV-SCNC: 113 MMOL/L (ref 98–107)
CHLORIDE SERPL-SCNC: 109 MMOL/L (ref 98–107)
CO2 BLDV CALC-SCNC: 21.1 MMOL/L (ref 22–28)
CO2 SERPL-SCNC: 18 MMOL/L (ref 23–31)
CREAT CL PREDICTED SERPL C-G-VRATE: 0 ML/MIN (ref 70–130)
GLOBULIN SER CALC-MCNC: 4.1 G/DL (ref 2.4–3.5)
GLUCOSE SERPL-MCNC: 36 MG/DL (ref 80–115)
HCO3 BLDV-SCNC: 20.2 MMOL/L (ref 22–28)
HCT VFR BLD CALC: 44 % (ref 36–47)
HGB BLD CALC-MCNC: 15 G/DL (ref 12–16)
HGB BLD-MCNC: 13 G/DL (ref 12–16)
MANUAL DIFF??: YES
MCH RBC QN AUTO: 27.7 PG (ref 27–31)
MCV RBC AUTO: 89.2 FL (ref 78–98)
MDIFF COMPLETE?: YES
PCO2 BLDV: 30.6 MMHG (ref 42–51)
PLATELET # BLD AUTO: 290 THOU/UL (ref 130–400)
POTASSIUM BLDV-SCNC: 4.3 MMOL/L (ref 3.5–5.1)
POTASSIUM SERPL-SCNC: 4.4 MMOL/L (ref 3.5–5.1)
RBC # BLD AUTO: 4.71 MILL/UL (ref 4.2–5.4)
SAO2 % BLDV FROM PO2: 99.7 % (ref 60–85)
SODIUM BLDV-SCNC: 144 MMOL/L (ref 138–145)
SODIUM SERPL-SCNC: 142 MMOL/L (ref 136–145)
WBC # BLD AUTO: 10.1 THOU/UL (ref 4.8–10.8)

## 2022-06-10 PROCEDURE — 93005 ELECTROCARDIOGRAM TRACING: CPT

## 2022-06-10 PROCEDURE — 84443 ASSAY THYROID STIM HORMONE: CPT

## 2022-06-10 PROCEDURE — 96360 HYDRATION IV INFUSION INIT: CPT

## 2022-06-10 PROCEDURE — 80053 COMPREHEN METABOLIC PANEL: CPT

## 2022-06-10 PROCEDURE — 36416 COLLJ CAPILLARY BLOOD SPEC: CPT

## 2022-06-10 PROCEDURE — 82803 BLOOD GASES ANY COMBINATION: CPT

## 2022-06-10 PROCEDURE — 70450 CT HEAD/BRAIN W/O DYE: CPT

## 2022-06-10 PROCEDURE — 94760 N-INVAS EAR/PLS OXIMETRY 1: CPT

## 2022-06-10 PROCEDURE — 85025 COMPLETE CBC W/AUTO DIFF WBC: CPT

## 2022-06-10 PROCEDURE — 82330 ASSAY OF CALCIUM: CPT

## 2023-01-21 ENCOUNTER — HOSPITAL ENCOUNTER (OUTPATIENT)
Dept: HOSPITAL 9 - MADLAB | Age: 63
Discharge: HOME | End: 2023-01-21
Attending: FAMILY MEDICINE
Payer: MEDICARE

## 2023-01-21 DIAGNOSIS — E79.0: ICD-10-CM

## 2023-01-21 DIAGNOSIS — E11.9: ICD-10-CM

## 2023-01-21 DIAGNOSIS — E78.5: Primary | ICD-10-CM

## 2023-01-21 LAB
ALBUMIN SERPL BCG-MCNC: 3.6 G/DL (ref 3.4–4.8)
ALP SERPL-CCNC: 85 U/L (ref 40–110)
ALT SERPL W P-5'-P-CCNC: 11 U/L (ref 8–55)
ANION GAP SERPL CALC-SCNC: 14 MMOL/L (ref 10–20)
AST SERPL-CCNC: 6 U/L (ref 5–34)
BILIRUB SERPL-MCNC: 0.3 MG/DL (ref 0.2–1.2)
BUN SERPL-MCNC: 22 MG/DL (ref 9.8–20.1)
CALCIUM SERPL-MCNC: 9.4 MG/DL (ref 7.8–10.44)
CHD RISK SERPL-RTO: 4.5 (ref ?–4.5)
CHLORIDE SERPL-SCNC: 108 MMOL/L (ref 98–107)
CHOLEST SERPL-MCNC: 233 MG/DL
CO2 SERPL-SCNC: 26 MMOL/L (ref 23–31)
CREAT CL PREDICTED SERPL C-G-VRATE: 0 ML/MIN (ref 70–130)
GLOBULIN SER CALC-MCNC: 3.9 G/DL (ref 2.4–3.5)
GLUCOSE SERPL-MCNC: 190 MG/DL (ref 80–115)
HDLC SERPL-MCNC: 52 MG/DL
LDLC SERPL CALC-MCNC: 150 MG/DL
POTASSIUM SERPL-SCNC: 3.8 MMOL/L (ref 3.5–5.1)
SODIUM SERPL-SCNC: 144 MMOL/L (ref 136–145)
TRIGL SERPL-MCNC: 156 MG/DL (ref ?–150)
URATE SERPL-MCNC: 6.1 MG/DL (ref 2.6–6)

## 2023-01-21 PROCEDURE — 83036 HEMOGLOBIN GLYCOSYLATED A1C: CPT

## 2023-01-21 PROCEDURE — 80053 COMPREHEN METABOLIC PANEL: CPT

## 2023-01-21 PROCEDURE — 36415 COLL VENOUS BLD VENIPUNCTURE: CPT

## 2023-01-21 PROCEDURE — 84550 ASSAY OF BLOOD/URIC ACID: CPT

## 2023-01-21 PROCEDURE — 80061 LIPID PANEL: CPT
